# Patient Record
Sex: FEMALE | Race: WHITE | NOT HISPANIC OR LATINO | Employment: OTHER | URBAN - METROPOLITAN AREA
[De-identification: names, ages, dates, MRNs, and addresses within clinical notes are randomized per-mention and may not be internally consistent; named-entity substitution may affect disease eponyms.]

---

## 2021-04-08 ENCOUNTER — TELEPHONE (OUTPATIENT)
Dept: SURGERY | Facility: CLINIC | Age: 71
End: 2021-04-08

## 2021-04-08 ENCOUNTER — TRANSCRIBE ORDERS (OUTPATIENT)
Dept: SURGERY | Facility: CLINIC | Age: 71
End: 2021-04-08

## 2021-04-08 DIAGNOSIS — R92.8 ABNORMAL FINDING ON BREAST IMAGING: Primary | ICD-10-CM

## 2021-04-08 DIAGNOSIS — R92.8 ABNORMAL MAMMOGRAM: Primary | ICD-10-CM

## 2021-04-08 NOTE — TELEPHONE ENCOUNTER
Called [facility] Scheduling, spoke with Esperanza, she said I need to talk with Heike  [phone] to scheduled Bx at [facility].    I had to leave Esperanza a message to call me.    Sabra

## 2021-04-08 NOTE — TELEPHONE ENCOUNTER
Scheduled Rt breast us guided core biopsy and rt post bx mammogram at [facility]. Scheduled with Heike  [phone]    4-15-21 arrive 8:00am    Spoke to pt's  he josh Montaño

## 2021-04-15 NOTE — TELEPHONE ENCOUNTER
Dr. Pulido with [facility] is requesting biopsy to be stereo right rather than u/s guided. Patient is there now, this is due to not being able to visualize the clips in breast.       Order entered for Dr. Cortez to sign.

## 2021-04-23 ENCOUNTER — TELEPHONE (OUTPATIENT)
Dept: SURGERY | Facility: CLINIC | Age: 71
End: 2021-04-23

## 2021-04-23 NOTE — TELEPHONE ENCOUNTER
New PT with Dr YOUNGBLOOD  6-21-21 arrive 8:00    Spoke to pt she earl    Mailed paperwork.    Sabra    
[FreeTextEntry8] : Has dizziness for less than 24 hours\par has nausea

## 2021-10-01 ENCOUNTER — TELEPHONE (OUTPATIENT)
Dept: SURGERY | Facility: CLINIC | Age: 71
End: 2021-10-01

## 2021-10-01 ENCOUNTER — OFFICE VISIT (OUTPATIENT)
Dept: SURGERY | Facility: CLINIC | Age: 71
End: 2021-10-01

## 2021-10-01 VITALS
HEART RATE: 81 BPM | TEMPERATURE: 97.3 F | HEIGHT: 62 IN | SYSTOLIC BLOOD PRESSURE: 178 MMHG | WEIGHT: 186 LBS | DIASTOLIC BLOOD PRESSURE: 94 MMHG | OXYGEN SATURATION: 99 % | BODY MASS INDEX: 34.23 KG/M2

## 2021-10-01 DIAGNOSIS — Z80.3 FH: BREAST CANCER: ICD-10-CM

## 2021-10-01 DIAGNOSIS — Z85.51 HISTORY OF BLADDER CANCER: ICD-10-CM

## 2021-10-01 DIAGNOSIS — E66.09 CLASS 1 OBESITY DUE TO EXCESS CALORIES WITHOUT SERIOUS COMORBIDITY WITH BODY MASS INDEX (BMI) OF 34.0 TO 34.9 IN ADULT: ICD-10-CM

## 2021-10-01 DIAGNOSIS — Z85.3 HISTORY OF BREAST CANCER: ICD-10-CM

## 2021-10-01 DIAGNOSIS — N64.89 BREAST ASYMMETRY: ICD-10-CM

## 2021-10-01 DIAGNOSIS — R92.8 ABNORMAL MAMMOGRAM: Primary | ICD-10-CM

## 2021-10-01 DIAGNOSIS — N64.1 BREAST, FAT NECROSIS: ICD-10-CM

## 2021-10-01 DIAGNOSIS — Z87.828 HISTORY OF TRAUMA OF BREAST: ICD-10-CM

## 2021-10-01 DIAGNOSIS — R92.8 ABNORMAL ULTRASOUND OF BREAST: ICD-10-CM

## 2021-10-01 PROCEDURE — 99204 OFFICE O/P NEW MOD 45 MIN: CPT | Performed by: SURGERY

## 2021-10-01 RX ORDER — ACETAMINOPHEN,DIPHENHYDRAMINE HCL 500; 25 MG/1; MG/1
1 TABLET, FILM COATED ORAL
COMMUNITY

## 2021-10-01 RX ORDER — UBIDECARENONE 75 MG
50 CAPSULE ORAL DAILY
COMMUNITY

## 2021-10-01 RX ORDER — ROSUVASTATIN CALCIUM 5 MG/1
TABLET, COATED ORAL
COMMUNITY
Start: 2021-09-16

## 2021-10-01 RX ORDER — BENAZEPRIL HYDROCHLORIDE 40 MG/1
TABLET, FILM COATED ORAL
COMMUNITY
Start: 2021-08-11

## 2021-10-01 NOTE — PROGRESS NOTES
Chief Complaint: eBrna Quinones is a 70 y.o. female who was seen in consultation at the request of Judy Gale RD  for abnormal breast imaging    History of Present Illness:  Patient presents with abnormal breast imaging. She noted no new masses, skin changes, nipple discharge, nipple changes prior to her most recent imaging.  She has a history of right breast cancer treated with breast conserving therapy and hormonal blockade in 2001.  She has had a significantly asymmetric right breast being much smaller than the left since that time.  In the fall 2020 she had a hard fall onto her right breast and had bruising and pain in this for some time.  Her most recent imaging this then showed imaging abnormalities of the right breast and eventually core biopsy returned as fat necrosis.  She is here to discuss an initial cytology aspirate that suggested atypia.    Her most recent imaging includes the following:     10/23/2018 BILATERAL SCREENING MAMMO WITH PAULA                  PREET QUINONES  The breasts are almost entirely fatty.  BI-RADS Category 1: Negative.    01/22/2020 BILATERAL SCREENING MAMMO WITH PAULA                  PREET QUINONES  Almost entirely fatty. There are post-lumpectomy changes in the upper outer quadrant of the right breast.  BI-RADS Category 2: Benign.    03/10/2021 BILATERAL SCREENING MAMMO WITH PAULA                  PREET QUINONES   The breast are almost entirely fatty. There is a focal asymmetry in the right breast in the central aspect region and in the medial aspect region.   BI-RADS Category 0.    04/01/2021   RIGHT DIAGNOSTIC MMG W/ PAULA & RIGHT US  PREET QUINONES  MMG:   Several small oval nodular foci in the medial right breast, new from 1/22/2020.  US:  Three small, lobular hypoechoic foci, 4-5:00 axis (5 cm FN) of the right breast, ranging in size from 4 mm to 6 mm. An additional hypoechoic nodule is imaged  along the 2:30 o’clock axis (6 cm FN) measuring 5 mm.  BI-RADS Category 4A.    She then had the following aspirations:    04/05/2021 RIGHT BREAST US GUIDED CYST ASPIRATION     PREET QUINONES  Site A, 2:30 right breast 6 cm from the nipple:  16 gauge needle. The cyst completely resolved on real time imaging. Less than 0.1 mL of yellowish fluid was aspirated.  Site B, 4:00, 5 cm from the nipple:  16 gauge needle. The cyst completely resolved. Less than 0.1 mL of yellowish fluid.  Site C, 5:00, 5 cm from the nipple:  The cyst completely resolved, Less than 0.1 mL of yellowish blood-tinged fluid, likely posttraumatic, was aspirated. This was sent for cytology. A venus clip was then placed under direct sonographic guidance.  Site D, 5:00, 5 cm from the nipple:  16 gauge. Less than 0.1 mL of milky fluid was aspirated and discarded. The cyst completely resolved.  ADDENDUM:  Cytology is concordant. A venus biopsy marker clip was placed at the time of the aspiration and is amenable to needle biopsy if core needle biopsy and pathology would change surgical management.  CYTOLOGY:  Cellular aspiration with cells present in sheets and also as single intact cells. Atypical, recommend further studies.  PATHOLOGIST COMMENT:  The ThinPrep demonstrates a cellular aspirate with cells present in disordered clumps as well as single cells with intact cytoplasm. The finding of isolated cells with cytoplasm is atypical.     04/15/2021 STEREOTACTIC/PAULA GUIDED BIOPSY RIGHT BREAST PREET QUINONES  9 gauge. A total of 10 specimens were obtained. A coil clip was then placed and placement was verified by a final image while the patient remained in compression. Post-procedure mammogram demonstrated appropriate clip placement. Please note that the original Venus shaped biopsy marker clip was removed at the time of biopsy.  Candy Pulido M.D.  ADDENDUM:  Final pathology considered benign and  concordant.    04/15/2021 SURGICAL PATHOLOGY  [facility] MIRNA QUINONES  Specimen A, Submitted as Right Breast 5:00 with Ca++’ 5 cm fn; Coil Clip; Stereotactic NCB for 4 mm Ca++:  Negative for atypical hyperplasia, carcinoma in situ or invasive carcinoma. Scant benign breast tissue consistent with organizing fat necrosis. Findings correlated with specimen X-Ray: microcalcifications did not survive processing.  Specimen B, Submitted as Right Breast 5:00 w/o Ca++; 5 cm fn; Coil Clip; BR4B; Stereotactic:  Benign breast adipose tissue. Deeper sections revealed no additional findings.       I then sent her pathology for outside review of both the cytology as well as the fat necrosis from the stereo:  05/05/2021 PATHOLOGY CONSULT             [facility]            MIRNA QUINONES  Breast, Right Fine Needle Aspiration:  Numerous clusters of epithelial cells (see comment)  Breast, Right 5:00, Stereotactic Core Needle Biopsy:  Scant mammary parenchyma; fibroadipose tissue with focal fat necrosis (see comment).  COMMENT:  The fine needle aspiration specimen contains numerous clusters of bland epithelial cells, an occasional single cells. Generally, the finding of single cells with cytoplasm in an aspirate warrants further evaluation; the stereotactic biopsy does not contain any proliferative epithelium. I essentially agree with the original pathologists’ diagnoses.      This was her first breast biopsy since her breast cancer.  She has had her uterus and one ovary removed,, is postmenopausal, and has not taken hormone replacement.  Her family history includes the following: She has 1 daughter, 3 sisters, 2 maternal aunts, 4 paternal aunts.  A daughter is a patient of ours Sabra Whittington had breast cancer at 52, a paternal cousin had breast cancer at 45 and she herself had breast cancer at 50.  She is here for evaluation.    Review of Systems:  Review of Systems   Hematological: Bruises/bleeds easily.   All  other systems reviewed and are negative.       Past Medical and Surgical History:  Breast Biopsy History:  Patient has had the following breast biopsies:2001 right malignant, 4/2021 right benign.   Breast Cancer HIstory:  Patient has the following past medical history of breast cancer treatment:  Breast Operations, and year:  Right breast cancer 2001, lumpectomy slnb.   Obstetric/Gynecologic History:  Age menstrual periods began: 9  Patient is postmenopausal due to removal of her uterus and one ovary in the following year: late 90's   Number of pregnancies:2  Number of live births: 2  Number of abortions or miscarriages: 0  Age of delivery of first child: 17  Patient did not breast feed.  Length of time taking birth control pills: none   Patient has never taken hormone replacement    Patient had uterus and one ovary removed.   Past Surgical History:   Procedure Laterality Date   • BLADDER SURGERY     • BREAST BIOPSY     • BREAST CYST ASPIRATION     • BREAST LUMPECTOMY Right 2001   • HYSTERECTOMY     • WRIST FRACTURE SURGERY       Past Medical History:   Diagnosis Date   • B12 deficiency    • Bladder cancer (HCC)    • H/O therapeutic radiation    • Hormone receptor positive breast cancer, right (HCC)    • HTN (hypertension)    • Hyperlipidemia    • Osteoarthritis    • Urgency incontinence    • Vitamin D deficiency, unspecified        Prior Hospitalizations, other than for surgery or childbirth, and year:  None     Social History     Socioeconomic History   • Marital status:      Spouse name: Not on file   • Number of children: Not on file   • Years of education: Not on file   • Highest education level: Not on file   Tobacco Use   • Smoking status: Never Smoker   • Smokeless tobacco: Never Used   Vaping Use   • Vaping Use: Never used   Substance and Sexual Activity   • Alcohol use: Not Currently   • Drug use: Never   • Sexual activity: Defer     Patient is .  Patient is a homemaker.  Patient drinks 3  "servings of caffeine per day.    Family History:  Family History   Problem Relation Age of Onset   • Basal cell carcinoma Mother 82   • Breast cancer Daughter 53   • Breast cancer Cousin 45        1st cousin        Vital Signs:  /94   Pulse 81   Temp 97.3 °F (36.3 °C)   Ht 157.5 cm (62\")   Wt 84.4 kg (186 lb)   LMP  (LMP Unknown)   SpO2 99%   Breastfeeding No   BMI 34.02 kg/m²      Medications:    Current Outpatient Medications:   •  benazepril (LOTENSIN) 40 MG tablet, , Disp: , Rfl:   •  Cholecalciferol 50 MCG (2000 UT) capsule, Take 2,000 Units by mouth Daily., Disp: , Rfl:   •  diphenhydrAMINE-acetaminophen (TYLENOL PM)  MG tablet per tablet, Take 1 tablet by mouth., Disp: , Rfl:   •  rosuvastatin (CRESTOR) 5 MG tablet, , Disp: , Rfl:   •  vitamin B-12 (cyanocobalamin) 100 MCG tablet, Take 50 mcg by mouth Daily., Disp: , Rfl:      Allergies:  Allergies   Allergen Reactions   • Amlodipine Other (See Comments)     Swelling in legs   • Atorvastatin Other (See Comments)     cramping   • Sulfa Antibiotics Hives       Physical Examination:  /94   Pulse 81   Temp 97.3 °F (36.3 °C)   Ht 157.5 cm (62\")   Wt 84.4 kg (186 lb)   LMP  (LMP Unknown)   SpO2 99%   Breastfeeding No   BMI 34.02 kg/m²   General Appearance:  Patient is in no distress.  She is well kept and has an obese build.   Psychiatric:  Patient with appropriate mood and affect. Alert and oriented to self, time, and place.    Breast, RIGHT:  medium sized, asymmetric with the contralateral side, left side significantly larger than the right due to right breast conservation treatment for breast cancer in 2001 .  There is a curvilinear incision at the extreme inferior axilla from her sentinel node and a curvilinear incision in the upper outer lateral right breast from her lumpectomy.  No radiation changes are visualized..  Breast skin is without erythema, edema, rashes.  There are no visible abnormalities upon inspection during the " arm-raising maneuver or with hands on hips in the sitting position. There is no nipple retraction, discharge or nipple/areolar skin changes.There are no masses palpable in the sitting or supine positions.    Breast, LEFT:  large sized, asymmetric with the contralateral side, as above.  Breast skin is without erythema, edema, rashes.  There are no visible abnormalities upon inspection during the arm-raising maneuver or with hands on hips in the sitting position. There is no nipple retraction, discharge or nipple/areolar skin changes.There are no masses palpable in the sitting or supine positions.    Lymphatic:  There is no axillary, cervical, infraclavicular, or supraclavicular adenopathy bilaterally.  Eyes:  Pupils are round and reactive to light.  Cardiovascular:  Heart rate and rhythm are regular.  Respiratory:  Lungs are clear bilaterally with no crackles or wheezes in any lung field.  Gastrointestinal:  Abdomen is soft, nondistended, and nontender.     Musculoskeletal:  Good strength in all 4 extremities.   There is good range of motion in both shoulders.    Skin:  No new skin lesions or rashes on the skin excluding the breast (see breast exam above).        Imaging:  10/23/2018 BILATERAL SCREENING MAMMO WITH PAULA                  PREET QUINONES  The breasts are almost entirely fatty.  BI-RADS Category 1: Negative.    01/22/2020 BILATERAL SCREENING MAMMO WITH PAULA                  PREET QUINONES  Almost entirely fatty. There are post-lumpectomy changes in the upper outer quadrant of the right breast.  BI-RADS Category 2: Benign.    03/10/2021 BILATERAL SCREENING MAMMO WITH PAULA                  PREET QUINONES   The breast are almost entirely fatty. There is a focal asymmetry in the right breast in the central aspect region and in the medial aspect region.   BI-RADS Category 0.    04/01/2021   RIGHT DIAGNOSTIC MMG W/ PAULA & RIGHT [facility]   MIRNA QUINONES  MMG:   Several small oval nodular foci in the medial right breast, new from 1/22/2020.  US:  Three small, lobular hypoechoic foci, 4-5:00 axis (5 cm FN) of the right breast, ranging in size from 4 mm to 6 mm. An additional hypoechoic nodule is imaged along the 2:30 o’clock axis (6 cm FN) measuring 5 mm.  BI-RADS Category 4A.    Pathology:  04/05/2021 RIGHT BREAST US GUIDED CYST ASPIRATION     PREET QUINONES  Site A, 2:30 right breast 6 cm from the nipple:  16 gauge needle. The cyst completely resolved on real time imaging. Less than 0.1 mL of yellowish fluid was aspirated.  Site B, 4:00, 5 cm from the nipple:  16 gauge needle. The cyst completely resolved. Less than 0.1 mL of yellowish fluid.  Site C, 5:00, 5 cm from the nipple:  The cyst completely resolved, Less than 0.1 mL of yellowish blood-tinged fluid, likely posttraumatic, was aspirated. This was sent for cytology. A venus clip was then placed under direct sonographic guidance.  Site D, 5:00, 5 cm from the nipple:  16 gauge. Less than 0.1 mL of milky fluid was aspirated and discarded. The cyst completely resolved.  ADDENDUM:  Cytology is concordant. A venus biopsy marker clip was placed at the time of the aspiration and is amenable to needle biopsy if core needle biopsy and pathology would change surgical management.  CYTOLOGY:  Cellular aspiration with cells present in sheets and also as single intact cells. Atypical, recommend further studies.  PATHOLOGIST COMMENT:  The ThinPrep demonstrates a cellular aspirate with cells present in disordered clumps as well as single cells with intact cytoplasm. The finding of isolated cells with cytoplasm is atypical.     04/15/2021 STEREOTACTIC/PAULA GUIDED BIOPSY RIGHT BREAST PREET QUINONES  9 gauge. A total of 10 specimens were obtained. A coil clip was then placed and placement was verified by a final image while the patient remained in compression. Post-procedure mammogram  demonstrated appropriate clip placement. Please note that the original Venus shaped biopsy marker clip was removed at the time of biopsy.  Candy Pulido M.D.  ADDENDUM:  Final pathology considered benign and concordant.    04/15/2021 SURGICAL PATHOLOGY  [facility] MIRNA QUINONES  Specimen A, Submitted as Right Breast 5:00 with Ca++’ 5 cm fn; Coil Clip; Stereotactic NCB for 4 mm Ca++:  Negative for atypical hyperplasia, carcinoma in situ or invasive carcinoma. Scant benign breast tissue consistent with organizing fat necrosis. Findings correlated with specimen X-Ray: microcalcifications did not survive processing.  Specimen B, Submitted as Right Breast 5:00 w/o Ca++; 5 cm fn; Coil Clip; BR4B; Stereotactic:  Benign breast adipose tissue. Deeper sections revealed no additional findings.    05/05/2021 PATHOLOGY CONSULT             [facility]            MIRNA QUINONES  Breast, Right Fine Needle Aspiration:  Numerous clusters of epithelial cells (see comment)  Breast, Right 5:00, Stereotactic Core Needle Biopsy:  Scant mammary parenchyma; fibroadipose tissue with focal fat necrosis (see comment).  COMMENT:  The fine needle aspiration specimen contains numerous clusters of bland epithelial cells, an occasional single cells. Generally, the finding of single cells with cytoplasm in an aspirate warrants further evaluation; the stereotactic biopsy does not contain any proliferative epithelium. I essentially agree with the original pathologists’ diagnoses.      Procedures:      Assessment:   Diagnosis Plan   1. Abnormal mammogram  Mammo Diagnostic Digital Tomosynthesis Right With CAD    US Breast Right Limited    Mammo Diagnostic Digital Tomosynthesis Bilateral With CAD   2. Abnormal ultrasound of breast  Mammo Diagnostic Digital Tomosynthesis Right With CAD    US Breast Right Limited    Mammo Diagnostic Digital Tomosynthesis Bilateral With CAD   3. History of breast cancer     4. History of bladder  cancer     5. FH: breast cancer     6. Breast asymmetry     7. Class 1 obesity due to excess calories without serious comorbidity with body mass index (BMI) of 34.0 to 34.9 in adult     8. Breast, fat necrosis     9. History of trauma of breast     1-2, 8-9  Right breast medial, 3 lesions to 30-5 o'clock.  All aspirated April 2021.  The right breast 5:00, 5 cm from the nipple location yielded blood tinged fluid and a Venus marker was left.  This cytology was read as clustered bland epithelial cells.  On outside pathology review.    I had this area rebiopsied at which time the initial venus marker was removed and a coil marker placed-  this showed fat necrosis.    Patient has history of a fall in the fall 2020 preceding this imaging.  She had bruising and a tender right breast at this time.  Clinical history and pathology are consistent with fat necrosis and is concordant with imaging.    Offered her the option of excisional biopsy of the coil marker versus serial imaging over 2 years.  She chose the latter.    3-  Age 50, 2001- BCT then tamoxifen 2 years then femara 2 years- Dr Baker    4-  2013 low-grade urothelial carcinoma resected by TURBT Dr. Beatty    5-  Daughter Sabra Whittington, a patient of ours, date of birth 3/6/1968, age 52 at the time of diagnosis  Paternal cousin age 45    6-  Left breast significantly larger than right due to past breast conserving treatment on the right.    7-  BMI 34      Plan:  Ms. Kerns is here today with one of her 3 sisters.  Her daughter Sabra Whittington is a patient of ours for whom we see for breast cancer.  We reviewed her past medical history, imaging, imaging reports, pathology reports, family history and examination together today.  She had an initially perplexing cytology read that was felt to be atypical.  We sent these to Dr. Cai who remarked that the cytology showed numerous bland epithelial cells no atypia and from here we decided to resample the area percutaneously.   This returned as fat necrosis and was felt by the image her to be concordant.  We discussed the option of excisional biopsy of the site with a coil marker versus every 6-month serial imaging for 2 years.  She preferred the latter.  We did discuss the risks of worsening the fat necrosis in an irradiated and traumatized field and risks of nonhealing.  We reviewed the risks of missing a pathologic upgrade by not excising.  She felt comfortable with serial clinical imaging and exam surveillance.    I will arrange for a right diagnostic mammogram and ultrasound at [facility] now and will call her with that result.  I will then arrange for a bilateral diagnostic mammogram at [facility] March 11, 2022 and we will see her back thereafter.    We also discussed that with her family history I would recommend genetic testing, namely the breast and gyn plus bladder [facility] panel.  She was interested in this and we have sent this off today.      With regards to her breast asymmetry, I offered for her to see Dr. Owens who is also her daughter Sabra Whittington's plastic surgeon whom they like very much.  At this time she wants to wait but in the future she may consider this.      Terri Cortez MD    Today I spent 45 minutes doing the following: Reviewing records, labs, outside imaging and reports in preparation for the patient visit; obtaining medical history; performing the physical exam; counseling and educating the patient and any available family or caregivers; ordering medications, tests or procedures; coordinating care with any other physicians on her care team as needed, and documenting all of the above in the medical record as well as sending communications with her other healthcare professionals.        Next Appointment:  Return for Next scheduled follow up, after imaging.      EMR Dragon/transcription disclaimer:    Much of this encounter note is an electronic transcription/translocation of spoken  language to printed text.  The electronic translation of spoken language may permit erroneous, or at times, nonsensical words or phrases to be inadvertently transcribed.  Although I have reviewed the note from such areas, some may still exist.

## 2021-10-01 NOTE — TELEPHONE ENCOUNTER
Right diag mammo with 3d and right limited breast US [facility]  Tuesday 10/26/21 9:15 am   We will call with results.     sudha diag mammo [facility] [place] 3/15/22 10:45 am arrival.   Follow up with Dr. Cortez 3/21/22 10:30 am.

## 2021-10-07 ENCOUNTER — TELEPHONE (OUTPATIENT)
Dept: SURGERY | Facility: CLINIC | Age: 71
End: 2021-10-07

## 2021-10-07 NOTE — TELEPHONE ENCOUNTER
[facility] breast and gynecology panel add on renal and urinary tract cancers dated October 1, 2021 returned is negative for mutation we will let her know.

## 2021-10-08 ENCOUNTER — TELEPHONE (OUTPATIENT)
Dept: SURGERY | Facility: CLINIC | Age: 71
End: 2021-10-08

## 2021-10-27 ENCOUNTER — TELEPHONE (OUTPATIENT)
Dept: SURGERY | Facility: CLINIC | Age: 71
End: 2021-10-27

## 2021-10-27 NOTE — TELEPHONE ENCOUNTER
[facility] right diagnostic mammogram with tomosynthesis: Short-term surveillance for biopsy-proven fat necrosis medial right breast  There is a coil clip at 5:00 anterior one third right breast at the site of biopsy-proven fat necrosis.  There is no new mass or distortion or suspicious calcification.  On ultrasound 5:00, 5 cm from the nipple there are 2 adjacent hypoechoic stable 4 and 3 mm masses.  At 4:00, 5 cm from the nipple there is a stable 3 mm hypoechoic mass most consistent with stable areas of fat necrosis.  BI-RADS 3

## 2022-02-25 ENCOUNTER — TELEPHONE (OUTPATIENT)
Dept: SURGERY | Facility: CLINIC | Age: 72
End: 2022-02-25

## 2022-02-25 NOTE — TELEPHONE ENCOUNTER
Patient requested to [facility]  Thursday 3/17/22 10:45 am.

## 2022-03-16 ENCOUNTER — TELEPHONE (OUTPATIENT)
Dept: SURGERY | Facility: CLINIC | Age: 72
End: 2022-03-16

## 2022-03-16 NOTE — TELEPHONE ENCOUNTER
New F/U appointment scheduled on 4/7/2022 @ 11:00am    Called Pt and LM for her to call back to confirm

## 2022-03-23 ENCOUNTER — TELEPHONE (OUTPATIENT)
Dept: SURGERY | Facility: CLINIC | Age: 72
End: 2022-03-23

## 2022-03-23 NOTE — TELEPHONE ENCOUNTER
[facility] bilateral diagnostic mammogram with 3D.  Right breast ultrasound:  Status post lumpectomy upper outer posterior right breast.  Biopsy clip at the site of biopsy-proven fat necrosis lower inner right breast.  On ultrasound ultrasound evaluation of previously seen masses at 5:00, 5 cm from the nipple there are 3 adjacent anechoic cyst consistent with benign fat necrosis unchanged.  4:00, 5 cm from the nipple a benign 3 mm cyst.  Impression benign cyst or fat necrosis lower inner right breast that is biopsy-proven and marked by coil clip.  No additional dedicated follow-up as needed BI-RADS 2

## 2022-04-07 ENCOUNTER — OFFICE VISIT (OUTPATIENT)
Dept: SURGERY | Facility: CLINIC | Age: 72
End: 2022-04-07

## 2022-04-07 VITALS
BODY MASS INDEX: 33.31 KG/M2 | OXYGEN SATURATION: 97 % | HEIGHT: 62 IN | DIASTOLIC BLOOD PRESSURE: 98 MMHG | SYSTOLIC BLOOD PRESSURE: 168 MMHG | WEIGHT: 181 LBS | HEART RATE: 91 BPM

## 2022-04-07 DIAGNOSIS — Z87.828 HISTORY OF TRAUMA OF BREAST: ICD-10-CM

## 2022-04-07 DIAGNOSIS — Z85.51 HISTORY OF BLADDER CANCER: ICD-10-CM

## 2022-04-07 DIAGNOSIS — N64.89 BREAST ASYMMETRY: ICD-10-CM

## 2022-04-07 DIAGNOSIS — R92.8 ABNORMAL ULTRASOUND OF BREAST: ICD-10-CM

## 2022-04-07 DIAGNOSIS — Z80.3 FH: BREAST CANCER: ICD-10-CM

## 2022-04-07 DIAGNOSIS — N64.1 BREAST, FAT NECROSIS: ICD-10-CM

## 2022-04-07 DIAGNOSIS — E66.09 CLASS 1 OBESITY DUE TO EXCESS CALORIES WITHOUT SERIOUS COMORBIDITY WITH BODY MASS INDEX (BMI) OF 34.0 TO 34.9 IN ADULT: ICD-10-CM

## 2022-04-07 DIAGNOSIS — R92.8 ABNORMAL MAMMOGRAM: Primary | ICD-10-CM

## 2022-04-07 DIAGNOSIS — Z85.3 HISTORY OF BREAST CANCER: ICD-10-CM

## 2022-04-07 PROCEDURE — 99213 OFFICE O/P EST LOW 20 MIN: CPT | Performed by: SURGERY

## 2022-04-07 NOTE — PROGRESS NOTES
Chief Complaint: Berna Quinones is a 71 y.o. female who was seen in consultation at the request of Judy Gale RD  for abnormal breast imaging    History of Present Illness:  Patient presents with abnormal breast imaging. She noted no new masses, skin changes, nipple discharge, nipple changes prior to her most recent imaging.  She has a history of right breast cancer treated with breast conserving therapy and hormonal blockade in 2001.  She has had a significantly asymmetric right breast being much smaller than the left since that time.  In the fall 2020 she had a hard fall onto her right breast and had bruising and pain in this for some time.  Her most recent imaging this then showed imaging abnormalities of the right breast and eventually core biopsy returned as fat necrosis.  She is here to discuss an initial cytology aspirate that suggested atypia.    Her most recent imaging includes the following:     10/23/2018 BILATERAL SCREENING MAMMO WITH PAULA                  PREET QUINONES  The breasts are almost entirely fatty.  BI-RADS Category 1: Negative.    01/22/2020 BILATERAL SCREENING MAMMO WITH PAULA                  PREET QUINONES  Almost entirely fatty. There are post-lumpectomy changes in the upper outer quadrant of the right breast.  BI-RADS Category 2: Benign.    03/10/2021 BILATERAL SCREENING MAMMO WITH PAULA                  PREET QUINONES   The breast are almost entirely fatty. There is a focal asymmetry in the right breast in the central aspect region and in the medial aspect region.   BI-RADS Category 0.    04/01/2021   RIGHT DIAGNOSTIC MMG W/ PAULA & RIGHT US  PREET QUINONES  MMG:   Several small oval nodular foci in the medial right breast, new from 1/22/2020.  US:  Three small, lobular hypoechoic foci, 4-5:00 axis (5 cm FN) of the right breast, ranging in size from 4 mm to 6 mm. An additional hypoechoic nodule is imaged  along the 2:30 o’clock axis (6 cm FN) measuring 5 mm.  BI-RADS Category 4A.    She then had the following aspirations:    04/05/2021 RIGHT BREAST US GUIDED CYST ASPIRATION     PREET QUINONES  Site A, 2:30 right breast 6 cm from the nipple:  16 gauge needle. The cyst completely resolved on real time imaging. Less than 0.1 mL of yellowish fluid was aspirated.  Site B, 4:00, 5 cm from the nipple:  16 gauge needle. The cyst completely resolved. Less than 0.1 mL of yellowish fluid.  Site C, 5:00, 5 cm from the nipple:  The cyst completely resolved, Less than 0.1 mL of yellowish blood-tinged fluid, likely posttraumatic, was aspirated. This was sent for cytology. A venus clip was then placed under direct sonographic guidance.  Site D, 5:00, 5 cm from the nipple:  16 gauge. Less than 0.1 mL of milky fluid was aspirated and discarded. The cyst completely resolved.  ADDENDUM:  Cytology is concordant. A venus biopsy marker clip was placed at the time of the aspiration and is amenable to needle biopsy if core needle biopsy and pathology would change surgical management.  CYTOLOGY:  Cellular aspiration with cells present in sheets and also as single intact cells. Atypical, recommend further studies.  PATHOLOGIST COMMENT:  The ThinPrep demonstrates a cellular aspirate with cells present in disordered clumps as well as single cells with intact cytoplasm. The finding of isolated cells with cytoplasm is atypical.     04/15/2021 STEREOTACTIC/PAULA GUIDED BIOPSY RIGHT BREAST PREET QUINONES  9 gauge. A total of 10 specimens were obtained. A coil clip was then placed and placement was verified by a final image while the patient remained in compression. Post-procedure mammogram demonstrated appropriate clip placement. Please note that the original Venus shaped biopsy marker clip was removed at the time of biopsy.  Candy Pulido M.D.  ADDENDUM:  Final pathology considered benign and  concordant.    04/15/2021 SURGICAL PATHOLOGY  [facility] MIRNA KERNS  Specimen A, Submitted as Right Breast 5:00 with Ca++’ 5 cm fn; Coil Clip; Stereotactic NCB for 4 mm Ca++:  Negative for atypical hyperplasia, carcinoma in situ or invasive carcinoma. Scant benign breast tissue consistent with organizing fat necrosis. Findings correlated with specimen X-Ray: microcalcifications did not survive processing.  Specimen B, Submitted as Right Breast 5:00 w/o Ca++; 5 cm fn; Coil Clip; BR4B; Stereotactic:  Benign breast adipose tissue. Deeper sections revealed no additional findings.       I then sent her pathology for outside review of both the cytology as well as the fat necrosis from the stereo:  05/05/2021 PATHOLOGY CONSULT             [facility]            MIRNA KERNS  Breast, Right Fine Needle Aspiration:  Numerous clusters of epithelial cells (see comment)  Breast, Right 5:00, Stereotactic Core Needle Biopsy:  Scant mammary parenchyma; fibroadipose tissue with focal fat necrosis (see comment).  COMMENT:  The fine needle aspiration specimen contains numerous clusters of bland epithelial cells, an occasional single cells. Generally, the finding of single cells with cytoplasm in an aspirate warrants further evaluation; the stereotactic biopsy does not contain any proliferative epithelium. I essentially agree with the original pathologists’ diagnoses.      This was her first breast biopsy since her breast cancer.  She has had her uterus and one ovary removed,, is postmenopausal, and has not taken hormone replacement.  Her family history includes the following: She has 1 daughter, 3 sisters, 2 maternal aunts, 4 paternal aunts.  A daughter is a patient of ours Sabra Whittington had breast cancer at 52, a paternal cousin had breast cancer at 45 and she herself had breast cancer at 50.    Interval HIstory:  In the interim,  Mirna Kerns  has done well.  She has noted no changes in her breast exam. No  new masses, skin changes, nipple changes, nipple discharge either breast.   She denies headache, bone pain, belly pain, cough, changes in vision or gait.  Her most recent imaging includes the following:    [facility] right diagnostic mammogram with tomosynthesis: Short-term surveillance for biopsy-proven fat necrosis medial right breast  There is a coil clip at 5:00 anterior one third right breast at the site of biopsy-proven fat necrosis.  There is no new mass or distortion or suspicious calcification.  On ultrasound 5:00, 5 cm from the nipple there are 2 adjacent hypoechoic stable 4 and 3 mm masses.  At 4:00, 5 cm from the nipple there is a stable 3 mm hypoechoic mass most consistent with stable areas of fat necrosis.  BI-RADS 3      [facility] bilateral diagnostic mammogram with 3D.  Right breast ultrasound:  Status post lumpectomy upper outer posterior right breast.  Biopsy clip at the site of biopsy-proven fat necrosis lower inner right breast.  On ultrasound ultrasound evaluation of previously seen masses at 5:00, 5 cm from the nipple there are 3 adjacent anechoic cyst consistent with benign fat necrosis unchanged.  4:00, 5 cm from the nipple a benign 3 mm cyst.  Impression benign cyst or fat necrosis lower inner right breast that is biopsy-proven and marked by coil clip.  No additional dedicated follow-up as needed BI-RADS 2      Her genetic testing results returned as normal:  [facility] breast and gynecology panel add on renal and urinary tract cancers dated October 1, 2021 returned is negative for mutation      She is here to review.      Review of Systems:  Review of Systems   Constitutional: Negative for unexpected weight change (5 lb wt loss).   All other systems reviewed and are negative.       Past Medical and Surgical History:  Breast Biopsy History:  Patient has had the following breast biopsies:2001 right malignant, 4/2021 right benign.   Breast Cancer HIstory:  Patient  "has the following past medical history of breast cancer treatment:  Breast Operations, and year:  Right breast cancer 2001, lumpectomy slnb.   Obstetric/Gynecologic History:  Age menstrual periods began: 9  Patient is postmenopausal due to removal of her uterus and one ovary in the following year: late 90's   Number of pregnancies:2  Number of live births: 2  Number of abortions or miscarriages: 0  Age of delivery of first child: 17  Patient did not breast feed.  Length of time taking birth control pills: none   Patient has never taken hormone replacement    Patient had uterus and one ovary removed.   Past Surgical History:   Procedure Laterality Date   • BLADDER SURGERY     • BREAST BIOPSY     • BREAST CYST ASPIRATION     • BREAST LUMPECTOMY Right 2001   • HYSTERECTOMY     • WRIST FRACTURE SURGERY       Past Medical History:   Diagnosis Date   • B12 deficiency    • Bladder cancer (HCC)    • H/O therapeutic radiation    • Hormone receptor positive breast cancer, right (HCC)    • HTN (hypertension)    • Hyperlipidemia    • Osteoarthritis    • Urgency incontinence    • Vitamin D deficiency, unspecified        Prior Hospitalizations, other than for surgery or childbirth, and year:  None     Social History     Socioeconomic History   • Marital status:    Tobacco Use   • Smoking status: Never Smoker   • Smokeless tobacco: Never Used   Vaping Use   • Vaping Use: Never used   Substance and Sexual Activity   • Alcohol use: Not Currently   • Drug use: Never   • Sexual activity: Defer     Patient is .  Patient is a homemaker.  Patient drinks 3 servings of caffeine per day.    Family History:  Family History   Problem Relation Age of Onset   • Basal cell carcinoma Mother 82   • Breast cancer Daughter 53   • Breast cancer Cousin 45        1st cousin        Vital Signs:  /98   Pulse 91   Ht 157.5 cm (62\")   Wt 82.1 kg (181 lb)   LMP  (LMP Unknown)   SpO2 97%   Breastfeeding No   BMI 33.11 kg/m²  " "    Medications:    Current Outpatient Medications:   •  benazepril (LOTENSIN) 40 MG tablet, , Disp: , Rfl:   •  Cholecalciferol 50 MCG (2000 UT) capsule, Take 2,000 Units by mouth Daily., Disp: , Rfl:   •  diphenhydrAMINE-acetaminophen (TYLENOL PM)  MG tablet per tablet, Take 1 tablet by mouth., Disp: , Rfl:   •  rosuvastatin (CRESTOR) 5 MG tablet, , Disp: , Rfl:   •  vitamin B-12 (CYANOCOBALAMIN) 100 MCG tablet, Take 50 mcg by mouth Daily., Disp: , Rfl:      Allergies:  Allergies   Allergen Reactions   • Amlodipine Other (See Comments)     Swelling in legs   • Atorvastatin Other (See Comments)     cramping   • Sulfa Antibiotics Hives       Physical Examination:  /98   Pulse 91   Ht 157.5 cm (62\")   Wt 82.1 kg (181 lb)   LMP  (LMP Unknown)   SpO2 97%   Breastfeeding No   BMI 33.11 kg/m²   General Appearance:  Patient is in no distress.  She is well kept and has an obese build.   Psychiatric:  Patient with appropriate mood and affect. Alert and oriented to self, time, and place.    Breast, RIGHT:  medium sized, asymmetric with the contralateral side, left side significantly larger than the right due to right breast conservation treatment for breast cancer in 2001 .  There is a curvilinear incision at the extreme inferior axilla from her sentinel node and a curvilinear incision in the upper outer lateral right breast from her lumpectomy.  No radiation changes are visualized..  Breast skin is without erythema, edema, rashes.  There are no visible abnormalities upon inspection during the arm-raising maneuver or with hands on hips in the sitting position. There is no nipple retraction, discharge or nipple/areolar skin changes.There are no masses palpable in the sitting or supine positions.    Breast, LEFT:  large sized, asymmetric with the contralateral side, as above.  Breast skin is without erythema, edema, rashes.  There are no visible abnormalities upon inspection during the arm-raising maneuver " or with hands on hips in the sitting position. There is no nipple retraction, discharge or nipple/areolar skin changes.There are no masses palpable in the sitting or supine positions.    Lymphatic:  There is no axillary, cervical, infraclavicular, or supraclavicular adenopathy bilaterally.  Eyes:  Pupils are round and reactive to light.  Cardiovascular:  Heart rate and rhythm are regular.  Respiratory:  Lungs are clear bilaterally with no crackles or wheezes in any lung field.  Gastrointestinal:  Abdomen is soft, nondistended, and nontender.     Musculoskeletal:  Good strength in all 4 extremities.   There is good range of motion in both shoulders.    Skin:  No new skin lesions or rashes on the skin excluding the breast (see breast exam above).        Imaging:  10/23/2018 BILATERAL SCREENING MAMMO WITH PAULA                  PREET QUINONES  The breasts are almost entirely fatty.  BI-RADS Category 1: Negative.    01/22/2020 BILATERAL SCREENING MAMMO WITH PAULA                  PREET QUINONES  Almost entirely fatty. There are post-lumpectomy changes in the upper outer quadrant of the right breast.  BI-RADS Category 2: Benign.    03/10/2021 BILATERAL SCREENING MAMMO WITH PAULA                  PREET QUINONES   The breast are almost entirely fatty. There is a focal asymmetry in the right breast in the central aspect region and in the medial aspect region.   BI-RADS Category 0.    04/01/2021   RIGHT DIAGNOSTIC MMG W/ PAULA & RIGHT US  PREET QUINONES  MMG:   Several small oval nodular foci in the medial right breast, new from 1/22/2020.  US:  Three small, lobular hypoechoic foci, 4-5:00 axis (5 cm FN) of the right breast, ranging in size from 4 mm to 6 mm. An additional hypoechoic nodule is imaged along the 2:30 o’clock axis (6 cm FN) measuring 5 mm.  BI-RADS Category 4A.    [facility] right diagnostic mammogram with tomosynthesis: Short-term  surveillance for biopsy-proven fat necrosis medial right breast  There is a coil clip at 5:00 anterior one third right breast at the site of biopsy-proven fat necrosis.  There is no new mass or distortion or suspicious calcification.  On ultrasound 5:00, 5 cm from the nipple there are 2 adjacent hypoechoic stable 4 and 3 mm masses.  At 4:00, 5 cm from the nipple there is a stable 3 mm hypoechoic mass most consistent with stable areas of fat necrosis.  BI-RADS 3      [facility] bilateral diagnostic mammogram with 3D.  Right breast ultrasound:  Status post lumpectomy upper outer posterior right breast.  Biopsy clip at the site of biopsy-proven fat necrosis lower inner right breast.  On ultrasound ultrasound evaluation of previously seen masses at 5:00, 5 cm from the nipple there are 3 adjacent anechoic cyst consistent with benign fat necrosis unchanged.  4:00, 5 cm from the nipple a benign 3 mm cyst.  Impression benign cyst or fat necrosis lower inner right breast that is biopsy-proven and marked by coil clip.  No additional dedicated follow-up as needed BI-RADS 2        Pathology:  04/05/2021 RIGHT BREAST US GUIDED CYST ASPIRATION     [facility]   MIRNA QUINONES  Site A, 2:30 right breast 6 cm from the nipple:  16 gauge needle. The cyst completely resolved on real time imaging. Less than 0.1 mL of yellowish fluid was aspirated.  Site B, 4:00, 5 cm from the nipple:  16 gauge needle. The cyst completely resolved. Less than 0.1 mL of yellowish fluid.  Site C, 5:00, 5 cm from the nipple:  The cyst completely resolved, Less than 0.1 mL of yellowish blood-tinged fluid, likely posttraumatic, was aspirated. This was sent for cytology. A venus clip was then placed under direct sonographic guidance.  Site D, 5:00, 5 cm from the nipple:  16 gauge. Less than 0.1 mL of milky fluid was aspirated and discarded. The cyst completely resolved.  ADDENDUM:  Cytology is concordant. A venus biopsy marker clip  was placed at the time of the aspiration and is amenable to needle biopsy if core needle biopsy and pathology would change surgical management.  CYTOLOGY:  Cellular aspiration with cells present in sheets and also as single intact cells. Atypical, recommend further studies.  PATHOLOGIST COMMENT:  The ThinPrep demonstrates a cellular aspirate with cells present in disordered clumps as well as single cells with intact cytoplasm. The finding of isolated cells with cytoplasm is atypical.     04/15/2021 STEREOTACTIC/PAULA GUIDED BIOPSY RIGHT BREAST PREET QUINONES  9 gauge. A total of 10 specimens were obtained. A coil clip was then placed and placement was verified by a final image while the patient remained in compression. Post-procedure mammogram demonstrated appropriate clip placement. Please note that the original Venus shaped biopsy marker clip was removed at the time of biopsy.  Candy Pulido M.D.  ADDENDUM:  Final pathology considered benign and concordant.    04/15/2021 SURGICAL PATHOLOGY  PREET QUINONES  Specimen A, Submitted as Right Breast 5:00 with Ca++’ 5 cm fn; Coil Clip; Stereotactic NCB for 4 mm Ca++:  Negative for atypical hyperplasia, carcinoma in situ or invasive carcinoma. Scant benign breast tissue consistent with organizing fat necrosis. Findings correlated with specimen X-Ray: microcalcifications did not survive processing.  Specimen B, Submitted as Right Breast 5:00 w/o Ca++; 5 cm fn; Coil Clip; BR4B; Stereotactic:  Benign breast adipose tissue. Deeper sections revealed no additional findings.    05/05/2021 PATHOLOGY CONSULT             [facility]            MIRNA QUINONES  Breast, Right Fine Needle Aspiration:  Numerous clusters of epithelial cells (see comment)  Breast, Right 5:00, Stereotactic Core Needle Biopsy:  Scant mammary parenchyma; fibroadipose tissue with focal fat necrosis (see comment).  COMMENT:  The fine needle aspiration specimen contains  numerous clusters of bland epithelial cells, an occasional single cells. Generally, the finding of single cells with cytoplasm in an aspirate warrants further evaluation; the stereotactic biopsy does not contain any proliferative epithelium. I essentially agree with the original pathologists’ diagnoses.        Labs:   [facility] breast and gynecology panel add on renal and urinary tract cancers dated October 1, 2021 returned is negative for mutation we will let her know.        Procedures:      Assessment:   Diagnosis Plan   1. Abnormal mammogram     2. Abnormal ultrasound of breast     3. History of breast cancer     4. History of bladder cancer     5. Breast asymmetry     6. Class 1 obesity due to excess calories without serious comorbidity with body mass index (BMI) of 34.0 to 34.9 in adult     7. Breast, fat necrosis     8. History of trauma of breast     9. FH: breast cancer              1-2, 7-8  Right breast medial, 3 lesions to 30-5 o'clock.  All aspirated April 2021.  The right breast 5:00, 5 cm from the nipple location yielded blood tinged fluid and a Venus marker was left.  This cytology was read as clustered bland epithelial cells.  On outside pathology review.    I had this area rebiopsied at which time the initial venus marker was removed and a coil marker placed-  this showed fat necrosis.    Patient has history of a fall in the fall 2020 preceding this imaging.  She had bruising and a tender right breast at this time.  Clinical history and pathology are consistent with fat necrosis and is concordant with imaging.    Offered her the option of excisional biopsy of the coil marker versus serial imaging over 2 years.  She chose the latter.    Follow-up imaging October 2021 in March 2022 demonstrated stability of fat necrosis BI-RADS 2.  3-  Age 50, 2001- BCT then tamoxifen 2 years then femara 2 years- Dr Baker    4-  2013 low-grade urothelial carcinoma resected by TURBT Dr. Beatty      5-  Left breast  significantly larger than right due to past breast conserving treatment on the right.    6-  BMI 34    9-  Daughter Sabra Whittington, a patient of ours, date of birth 3/6/1968, age 52 at the time of diagnosis  Paternal cousin age 45    [facility] add on renal and urinary tract cancers dated October 1, 2021 returned is negative for mutation         Plan:  Ms. Kerns is here today for a follow-up visit.  We reviewed her interval history, imaging, imaging reports and examination together today.  She is asymptomatic.  The areas of presumed fat necrosis in the right breast are stable.  BI-RADS 2.  At this juncture she can return to routine screening at her next screening mammogram would be due March 24, 2023 at [facility].  I have given her this reminder today.      I asked her to continue her self breast exam and call us in the future with any concerns and we would be happy to see her back.       Her daughter Sabra Whittington is a patient of ours for whom we see for breast cancer.        With regards to her breast asymmetry, previously  I offered for her to see Dr. Owens who is also her daughter Sabra Whittington's plastic surgeon whom they like very much.  At this time she wants to wait but in the future she may consider this.      Terri Cortez MD    Today I spent 45 minutes doing the following: Reviewing records, labs, outside imaging and reports in preparation for the patient visit; obtaining medical history; performing the physical exam; counseling and educating the patient and any available family or caregivers; ordering medications, tests or procedures; coordinating care with any other physicians on her care team as needed, and documenting all of the above in the medical record as well as sending communications with her other healthcare professionals.        Next Appointment:  Return for any future concerns.      EMR Dragon/transcription disclaimer:    Much of this encounter note is an  electronic transcription/translocation of spoken language to printed text.  The electronic translation of spoken language may permit erroneous, or at times, nonsensical words or phrases to be inadvertently transcribed.  Although I have reviewed the note from such areas, some may still exist.